# Patient Record
Sex: MALE | Race: WHITE | Employment: UNEMPLOYED | URBAN - METROPOLITAN AREA
[De-identification: names, ages, dates, MRNs, and addresses within clinical notes are randomized per-mention and may not be internally consistent; named-entity substitution may affect disease eponyms.]

---

## 2019-04-20 ENCOUNTER — HOSPITAL ENCOUNTER (EMERGENCY)
Age: 24
Discharge: HOME | End: 2019-04-20
Attending: EMERGENCY MEDICINE
Payer: COMMERCIAL

## 2019-04-20 VITALS
DIASTOLIC BLOOD PRESSURE: 83 MMHG | SYSTOLIC BLOOD PRESSURE: 134 MMHG | RESPIRATION RATE: 15 BRPM | HEART RATE: 99 BPM | OXYGEN SATURATION: 99 % | TEMPERATURE: 98.8 F

## 2019-04-20 DIAGNOSIS — K13.70 MOUTH LESION: Primary | ICD-10-CM

## 2019-04-20 PROCEDURE — 99282 EMERGENCY DEPT VISIT SF MDM: CPT

## 2019-04-20 RX ORDER — NYSTATIN 100000 [USP'U]/ML
200000 SUSPENSION ORAL 4 TIMES DAILY
Qty: 56 ML | Refills: 0 | Status: SHIPPED | OUTPATIENT
Start: 2019-04-20 | End: 2019-04-27

## 2019-04-20 NOTE — ED TRIAGE NOTES
Pt arrives ambulatory to ED with girlfriend. C/O inside of mouth peeling onset 2 weeks ago. Denies recent changes in oral hygiene products.

## 2019-04-20 NOTE — ED PROVIDER NOTES
Pt is a 27 y/o male with no significant pmh who presents today with c/o peeling to inside of mouth that started 1 months ago, went away for a week and then returned 2 weeks ago. Nonpainful. States it is more irritating due to the skin peeling. Denies any new oral products or any other complaints. He has taken no medication for this. He has a h/o chewing tobacco for 2 years but stopped about 6 months ago. No past medical history on file. No past surgical history on file. No family history on file. Social History Socioeconomic History  Marital status: SINGLE Spouse name: Not on file  Number of children: Not on file  Years of education: Not on file  Highest education level: Not on file Occupational History  Not on file Social Needs  Financial resource strain: Not on file  Food insecurity:  
  Worry: Not on file Inability: Not on file  Transportation needs:  
  Medical: Not on file Non-medical: Not on file Tobacco Use  Smoking status: Not on file Substance and Sexual Activity  Alcohol use: Not on file  Drug use: Not on file  Sexual activity: Not on file Lifestyle  Physical activity:  
  Days per week: Not on file Minutes per session: Not on file  Stress: Not on file Relationships  Social connections:  
  Talks on phone: Not on file Gets together: Not on file Attends Confucianist service: Not on file Active member of club or organization: Not on file Attends meetings of clubs or organizations: Not on file Relationship status: Not on file  Intimate partner violence:  
  Fear of current or ex partner: Not on file Emotionally abused: Not on file Physically abused: Not on file Forced sexual activity: Not on file Other Topics Concern  Not on file Social History Narrative  Not on file ALLERGIES: Pcn [penicillins] Review of Systems Constitutional: Negative for chills and fever. HENT: Negative for ear pain, sore throat, trouble swallowing and voice change. Peeling to inside of mouth Respiratory: Negative. Cardiovascular: Negative. All other systems reviewed and are negative. Vitals:  
 04/20/19 1014 Pulse: (!) 102 SpO2: 99% Physical Exam  
Constitutional: He is oriented to person, place, and time. He appears well-developed and well-nourished. HENT:  
Inside of cheek mucosa grey/white patches noted. Nonpainful. Scattered inside. Tongue also has grey coating on it. No bleeding. No pain. Does not scrape away. Neck: Normal range of motion. Pulmonary/Chest: Breath sounds normal. No respiratory distress. Musculoskeletal: Normal range of motion. Neurological: He is alert and oriented to person, place, and time. Skin: Skin is warm and dry. Nursing note and vitals reviewed. MDM Number of Diagnoses or Management Options Mouth lesion:  
Diagnosis management comments: Pt has grey/white skin and ulcerations that are not painful to the mucous membrane. POssible leukoplakia from irritation but unclear the exact cause. He would benefit from an evaluation by [facility] on an outpatient basis. Will try nystatin today and see if his symptoms improve. Procedures

## 2019-04-20 NOTE — ED NOTES
1111: Patient verbalizes understanding of discharge instructions given by provider, Macky Kussmaul, NP. Opportunity for questions provided. Patient in no apparent distress, VSS. Patient ambulatory upon discharge. Visit Vitals /83 (BP 1 Location: Left arm, BP Patient Position: At rest) Pulse 99 Temp 98.8 °F (37.1 °C) Resp 15 SpO2 99%